# Patient Record
Sex: FEMALE | Race: WHITE
[De-identification: names, ages, dates, MRNs, and addresses within clinical notes are randomized per-mention and may not be internally consistent; named-entity substitution may affect disease eponyms.]

---

## 2019-11-10 ENCOUNTER — HOSPITAL ENCOUNTER (INPATIENT)
Dept: HOSPITAL 92 - L&D | Age: 24
LOS: 4 days | Discharge: HOME | End: 2019-11-14
Attending: OBSTETRICS & GYNECOLOGY | Admitting: OBSTETRICS & GYNECOLOGY
Payer: COMMERCIAL

## 2019-11-10 VITALS — BODY MASS INDEX: 45.4 KG/M2

## 2019-11-10 DIAGNOSIS — E03.9: ICD-10-CM

## 2019-11-10 DIAGNOSIS — Z3A.37: ICD-10-CM

## 2019-11-10 LAB
ALBUMIN SERPL BCG-MCNC: 3.4 G/DL (ref 3.5–5)
ALP SERPL-CCNC: 329 U/L (ref 40–110)
ALT SERPL W P-5'-P-CCNC: 8 U/L (ref 8–55)
ANION GAP SERPL CALC-SCNC: 16 MMOL/L (ref 10–20)
AST SERPL-CCNC: 21 U/L (ref 5–34)
BILIRUB SERPL-MCNC: 0.3 MG/DL (ref 0.2–1.2)
BUN SERPL-MCNC: 8 MG/DL (ref 7–18.7)
CALCIUM SERPL-MCNC: 9.2 MG/DL (ref 7.8–10.44)
CHLORIDE SERPL-SCNC: 108 MMOL/L (ref 98–107)
CO2 SERPL-SCNC: 17 MMOL/L (ref 22–29)
CREAT CL PREDICTED SERPL C-G-VRATE: 284 ML/MIN (ref 70–130)
GLOBULIN SER CALC-MCNC: 3.1 G/DL (ref 2.4–3.5)
GLUCOSE SERPL-MCNC: 91 MG/DL (ref 70–105)
HBSAG INDEX: 0.21 S/CO (ref 0–0.99)
HGB BLD-MCNC: 10.7 G/DL (ref 12–16)
MCH RBC QN AUTO: 29.7 PG (ref 27–31)
MCV RBC AUTO: 86.6 FL (ref 78–98)
PLATELET # BLD AUTO: 238 THOU/UL (ref 130–400)
POTASSIUM SERPL-SCNC: 4.1 MMOL/L (ref 3.5–5.1)
RBC # BLD AUTO: 3.6 MILL/UL (ref 4.2–5.4)
SODIUM SERPL-SCNC: 137 MMOL/L (ref 136–145)
SYPHILIS ANTIBODY INDEX: 0.06 S/CO
WBC # BLD AUTO: 10.9 THOU/UL (ref 4.8–10.8)

## 2019-11-10 PROCEDURE — S0020 INJECTION, BUPIVICAINE HYDRO: HCPCS

## 2019-11-10 PROCEDURE — 51702 INSERT TEMP BLADDER CATH: CPT

## 2019-11-10 PROCEDURE — 85027 COMPLETE CBC AUTOMATED: CPT

## 2019-11-10 PROCEDURE — 86780 TREPONEMA PALLIDUM: CPT

## 2019-11-10 PROCEDURE — 80053 COMPREHEN METABOLIC PANEL: CPT

## 2019-11-10 PROCEDURE — 86850 RBC ANTIBODY SCREEN: CPT

## 2019-11-10 PROCEDURE — 90707 MMR VACCINE SC: CPT

## 2019-11-10 PROCEDURE — 86900 BLOOD TYPING SEROLOGIC ABO: CPT

## 2019-11-10 PROCEDURE — 36415 COLL VENOUS BLD VENIPUNCTURE: CPT

## 2019-11-10 PROCEDURE — 86901 BLOOD TYPING SEROLOGIC RH(D): CPT

## 2019-11-10 PROCEDURE — 87340 HEPATITIS B SURFACE AG IA: CPT

## 2019-11-10 RX ADMIN — SODIUM CHLORIDE SCH: 0.9 INJECTION, SOLUTION INTRAVENOUS at 18:36

## 2019-11-10 NOTE — PDOC.LDHP
Labor and Delivery H&P


Chief complaint: scheduled induction (For Pregnancy induced hypertension.)


HPI: 





25 y/o WF  at 37 weeks and 3 days with edc of . Pregnancy complicated 

by onset of pregnancy induced hypertension at 34 weeks. Urine protein 1+. 

Serial labs normal. Blood pressures range 140-150/80-90. No symptoms. 


Current gestational age (weeks): 37


Due date: 19


Dating criteria: last menstrual period (and confirmatory first trimester 

ultrasound.)


Grav: 1


Para: 0


Current pregnancy complications: hypertension, other (PCOS on metformin/

hypothyroidism/stable left ovarian cyst 3.7 x 2.1 cm...)


Abnormal US findings: No


Past Medical History: 





PCOS/HYPOTHYROIDISM/Left ovarian cyst


Current medications: pre-wilda vitamins (metformin/synthroid)


Previous surgical history: none


Allergies/Adverse Reactions: 


 Allergies











Allergy/AdvReac Type Severity Reaction Status Date / Time


 


ciprofloxacin [From Cipro] Allergy  Nausea Verified 10/18/19 12:16











Social history: none





- Vaginal Exam


cm dilated: 0


Effacement: 25%


Station: -1





- OB Labs


Blood type: A


RH: positive


Antibody Screen: negative


HIV: negative


RPR: negative


HEPSAg: negative


1 hour GCT: negative


GBS: negative


Urine drug screen: negative


Rubella: immune





- Assessment


L&D Assessment: medically indicated induction (Mild gestational hypertension at 

37-38 nweeks.)





- Plan


Plan: admit to L&D, cervical ripening, labor augmentation if indicated (blood 

pressure monitoring along with PIH labs. Antihypertensive therapy and or 

magnesium sulfate prophylaxis if indicated.), anesthesia consult for pain 

management (Labs and blood pressure monitoring. Magnesium prophylaxis if 

indicated along with hypertensive therapy...)

## 2019-11-11 RX ADMIN — CALCIUM CARBONATE PRN MG: 500 TABLET, CHEWABLE ORAL at 11:56

## 2019-11-11 RX ADMIN — CALCIUM CARBONATE PRN MG: 500 TABLET, CHEWABLE ORAL at 00:59

## 2019-11-11 RX ADMIN — SODIUM CHLORIDE SCH MLS: 0.9 INJECTION, SOLUTION INTRAVENOUS at 06:21

## 2019-11-12 RX ADMIN — DOCUSATE CALCIUM SCH MG: 240 CAPSULE, LIQUID FILLED ORAL at 21:44

## 2019-11-12 RX ADMIN — Medication SCH ML: at 06:15

## 2019-11-12 RX ADMIN — Medication SCH ML: at 01:09

## 2019-11-12 NOTE — PDOC.OPDEL
OB Operative/Delivery Note


Delivery Dr/Surgeon: Shaw


Pre-Delivery Diagnosis: medically indicated induction


Procedure/Post Delivery Dx: spontaneous vaginal delivery


Weeks gestation: 37


Anesthesia: epidural





- Findings


  ** A


Sex: female


Apgar - 1 min: 9


Apgar - 5 min: 9





- Additional Findings/Plan


Placenta delivered: spontaneous


Repaired Obstetrical Laceration: 1st degree


Estimated blood loss: 150ml


Post delivery plan: routine recovery

## 2019-11-13 RX ADMIN — DOCUSATE CALCIUM SCH MG: 240 CAPSULE, LIQUID FILLED ORAL at 08:42

## 2019-11-13 RX ADMIN — DOCUSATE CALCIUM SCH MG: 240 CAPSULE, LIQUID FILLED ORAL at 21:39

## 2019-11-13 NOTE — PDOC.PP
Post Partum Progress Note


Post Partum Day #: 1


PO intake tolerated: yes


Flatus: yes


Ambulation: yes


 Vital Signs (12 hours)











  Temp Pulse Resp BP Pulse Ox


 


 11/13/19 08:07  97.8 F  86  20  107/61  97


 


 11/13/19 04:00  97.8 F  84  18  104/57 L 


 


 11/13/19 00:09  97.7 F  85  18  121/64 








 Weight











Weight                         265 lb

















- Physical Examination


Respiratory: clear to auscultation bilaterally, non-labored breathing


Result Diagrams: 


 11/10/19 18:09





 11/10/19 18:09


Additional Labs: 


 Post Partum Labs











Blood Type  A POSITIVE   11/10/19  18:33    


 


Hep Bs Antigen  Non-Reactive S/CO (NonReactive)   11/10/19  18:09    














- Assessment/Plan





Post partum #1. Doing well. Blood pressure normalized. routine care.

## 2019-11-14 VITALS — DIASTOLIC BLOOD PRESSURE: 67 MMHG | SYSTOLIC BLOOD PRESSURE: 118 MMHG | TEMPERATURE: 97.9 F

## 2019-11-14 PROCEDURE — 0HQ9XZZ REPAIR PERINEUM SKIN, EXTERNAL APPROACH: ICD-10-PCS | Performed by: OBSTETRICS & GYNECOLOGY

## 2019-11-14 RX ADMIN — DOCUSATE CALCIUM SCH MG: 240 CAPSULE, LIQUID FILLED ORAL at 09:05

## 2019-11-14 NOTE — PDOC.PP
Post Partum Progress Note


Post Partum Day #: 2


PO intake tolerated: yes


Flatus: yes


Ambulation: yes


 Vital Signs (12 hours)











  Temp Pulse Resp BP Pulse Ox


 


 11/13/19 21:00  98.6 F  94  16  109/56 L  97








 Weight











Weight                         265 lb














Result Diagrams: 


 11/10/19 18:09





 11/10/19 18:09


Additional Labs: 


 Post Partum Labs











Blood Type  A POSITIVE   11/10/19  18:33    


 


Hep Bs Antigen  Non-Reactive S/CO (NonReactive)   11/10/19  18:09    














- Assessment/Plan





Post partum day 2. Blood pressures normal. Ready for discharge. If baby has to 

stay for bili lights, then b&B...Has 6 weeks follow up.

## 2023-04-25 ENCOUNTER — HOSPITAL ENCOUNTER (INPATIENT)
Dept: HOSPITAL 92 - CSHLD | Age: 28
LOS: 3 days | Discharge: HOME | End: 2023-04-28
Attending: OBSTETRICS & GYNECOLOGY | Admitting: OBSTETRICS & GYNECOLOGY
Payer: COMMERCIAL

## 2023-04-25 VITALS — BODY MASS INDEX: 43.7 KG/M2

## 2023-04-25 DIAGNOSIS — Z3A.39: ICD-10-CM

## 2023-04-25 DIAGNOSIS — E03.9: ICD-10-CM

## 2023-04-25 DIAGNOSIS — Z88.1: ICD-10-CM

## 2023-04-25 PROCEDURE — 85018 HEMOGLOBIN: CPT

## 2023-04-25 PROCEDURE — 86901 BLOOD TYPING SEROLOGIC RH(D): CPT

## 2023-04-25 PROCEDURE — 51702 INSERT TEMP BLADDER CATH: CPT

## 2023-04-25 PROCEDURE — 36415 COLL VENOUS BLD VENIPUNCTURE: CPT

## 2023-04-25 PROCEDURE — 86900 BLOOD TYPING SEROLOGIC ABO: CPT

## 2023-04-25 PROCEDURE — 86780 TREPONEMA PALLIDUM: CPT

## 2023-04-25 PROCEDURE — 86850 RBC ANTIBODY SCREEN: CPT

## 2023-04-25 PROCEDURE — 87340 HEPATITIS B SURFACE AG IA: CPT

## 2023-04-25 PROCEDURE — 85027 COMPLETE CBC AUTOMATED: CPT

## 2023-04-25 PROCEDURE — 85014 HEMATOCRIT: CPT

## 2023-04-26 LAB
HBSAG INDEX: 0.26 S/CO (ref 0–0.99)
HGB BLD-MCNC: 11.2 G/DL (ref 12–15.5)
MCH RBC QN AUTO: 28.5 PG (ref 27–33)
MCV RBC AUTO: 87.3 FL (ref 81.6–98.3)
PLATELET # BLD AUTO: 237 10X3/UL (ref 150–450)
RBC # BLD AUTO: 3.93 10X6/UL (ref 3.9–5.03)
SYPHILIS ANTIBODY INDEX: 0.08 S/CO
WBC # BLD AUTO: 9.5 10X3/UL (ref 3.5–10.5)

## 2023-04-27 LAB — HGB BLD-MCNC: 10.2 G/DL (ref 12–15.5)

## 2023-04-28 VITALS — TEMPERATURE: 98.4 F | SYSTOLIC BLOOD PRESSURE: 121 MMHG | DIASTOLIC BLOOD PRESSURE: 74 MMHG
